# Patient Record
Sex: FEMALE | Race: WHITE
[De-identification: names, ages, dates, MRNs, and addresses within clinical notes are randomized per-mention and may not be internally consistent; named-entity substitution may affect disease eponyms.]

---

## 2018-09-05 ENCOUNTER — HOSPITAL ENCOUNTER (EMERGENCY)
Dept: HOSPITAL 97 - ER | Age: 83
Discharge: HOME | End: 2018-09-05
Payer: COMMERCIAL

## 2018-09-05 VITALS — OXYGEN SATURATION: 99 % | DIASTOLIC BLOOD PRESSURE: 78 MMHG | SYSTOLIC BLOOD PRESSURE: 121 MMHG

## 2018-09-05 VITALS — TEMPERATURE: 98.8 F

## 2018-09-05 DIAGNOSIS — Z91.048: ICD-10-CM

## 2018-09-05 DIAGNOSIS — I25.2: ICD-10-CM

## 2018-09-05 DIAGNOSIS — Z91.040: ICD-10-CM

## 2018-09-05 DIAGNOSIS — Z86.73: ICD-10-CM

## 2018-09-05 DIAGNOSIS — R04.0: Primary | ICD-10-CM

## 2018-09-05 DIAGNOSIS — Z79.82: ICD-10-CM

## 2018-09-05 DIAGNOSIS — Z79.01: ICD-10-CM

## 2018-09-05 PROCEDURE — 99283 EMERGENCY DEPT VISIT LOW MDM: CPT

## 2018-09-05 NOTE — ER
Nurse's Notes                                                                                     

 [facility]                                                                

Name: Farzaneh Ortiz                                                                               

Age: 85 yrs                                                                                       

Sex: Female                                                                                       

: 1932                                                                                   

MRN: [id number]                                                                                   

Arrival Date: 2018                                                                          

Time: 14:24                                                                                       

Account#: [id number]                                                                            

Bed 14                                                                                            

Private MD: Aly Quevedo E                                                                     

Diagnosis: Epistaxis                                                                              

                                                                                                  

Presentation:                                                                                     

                                                                                             

14:33 Presenting complaint: Significant other states: "her nose started bleeding today around aa5 

      11am". Pt reports she takes ASA and Plavix daily. Transition of care: patient was not       

      received from another setting of care. Onset of symptoms was 2018. Risk       

      Assessment: Do you want to hurt yourself or someone else? Patient reports no desire to      

      harm self or others. Initial Sepsis Screen: Does the patient meet any 2 criteria? No.       

      Patient's initial sepsis screen is negative. Does the patient have a suspected source       

      of infection? No. Patient's initial sepsis screen is negative. Care prior to arrival:       

      None.                                                                                       

14:33 Method Of Arrival: Wheelchair                                                           aa5 

14:33 Acuity: SHARIF 3                                                                           aa5 

                                                                                                  

Historical:                                                                                       

- Allergies:                                                                                      

14:35 Latex, Natural Rubber;                                                                  aa5 

- PMHx:                                                                                           

14:35 CHF; CVA; Myocardial infarction;                                                        aa5 

- PSHx:                                                                                           

14:35 Cholecystectomy;                                                                        aa5 

                                                                                                  

- Immunization history:: Adult Immunizations not up to date.                                      

- Social history:: Smoking status: Patient/guardian denies using tobacco.                         

- Ebola Screening: : No symptoms or risks identified at this time.                                

                                                                                                  

                                                                                                  

Screenin:45 Abuse screen: Denies threats or abuse. Denies injuries from another. Nutritional        aj  

      screening: No deficits noted. Tuberculosis screening: No symptoms or risk factors           

      identified. Fall Risk None identified.                                                      

                                                                                                  

Assessment:                                                                                       

14:45 General: Appears in no apparent distress. comfortable, Behavior is calm, cooperative,   aj  

      appropriate for age. Pain: Denies pain. Neuro: Level of Consciousness is awake, alert,      

      obeys commands, Oriented to person, place, time, situation, Appropriate for age.            

      Respiratory: Airway is patent Respiratory effort is even, unlabored, Respiratory            

      pattern is regular, symmetrical. EENT: Reports nasal discharge that is bloody. Derm:        

      Skin is intact, is healthy with good turgor, Skin is pink, warm \T\ dry. normal.            

14:46 Reassessment: Nose clamp placed on patient.                                             aj  

                                                                                                  

Vital Signs:                                                                                      

14:35  / 82; Pulse 76; Resp 18 S; Temp 98.8(TE); Pulse Ox 97% on R/A; Weight 56.7 kg    aa5 

      (R); Pain 0/10;                                                                             

16:07  / 78; Pulse 79; Resp 18; Pulse Ox 99% on R/A;                                    aj  

                                                                                                  

ED Course:                                                                                        

14:24 Patient arrived in ED.                                                                  rg4 

14:25 Aly Quevedo MD is Private Physician.                                                rg4 

14:34 Triage completed.                                                                       aa5 

14:35 Arm band placed on.                                                                     aa5 

14:44 Krystyna Macias, RN is Primary Nurse.                                                     aj  

14:45 Patient has correct armband on for positive identification.                             aj  

14:49 [facility]                                                        kb  

14:49 Eleuterio Dai MD is Attending Physician.                                             kb  

16:07 No provider procedures requiring assistance completed. Patient did not have IV access   aj  

      during this emergency room visit.                                                           

                                                                                                  

Administered Medications:                                                                         

14:56 Drug: Arpit-Synephrine Spray 0.5 % 2 sprays Route: Intranasal; Site: right nare;          aj  

16:08 Follow up: Response: Marked relief of symptoms                                          aj  

                                                                                                  

                                                                                                  

Outcome:                                                                                          

16:02 Discharge ordered by MD.                                                                kb  

16:07 Discharged to home via wheelchair, with family.                                         aj  

16:07 Condition: good                                                                             

16:07 Discharge instructions given to patient, family, Instructed on discharge instructions,      

      follow up and referral plans. Demonstrated understanding of instructions, follow-up         

      care.                                                                                       

16:08 Patient left the ED.                                                                    aj  

                                                                                                  

Signatures:                                                                                       

Florecita Chong FNP-C FNP-Krystyna Patricio RN RN aj Calderon, Audri, RN RN aa5 Garcia, Rubi                                 rg4                                                  

                                                                                                  

**************************************************************************************************

## 2018-09-05 NOTE — EDPHYS
Physician Documentation                                                                           

 [facility]                                                                

Name: Farzaneh Ortiz                                                                               

Age: 85 yrs                                                                                       

Sex: Female                                                                                       

: 1932                                                                                   

MRN: [id number]                                                                                   

Arrival Date: 2018                                                                          

Time: 14:24                                                                                       

Account#: [id number]                                                                            

Bed 14                                                                                            

Private MD: Aly Quevedo E                                                                     

ED Physician Eleuterio Dai                                                                      

HPI:                                                                                              

                                                                                             

15:10 This 85 yrs old  Female presents to ER via Wheelchair with complaints of Nose  kb  

      Bleed.                                                                                      

15:10 The patient presents with a nose bleed, occurred from an unknown cause, that is         kb  

      continuous small amount bright red, causative factors include: aspirin therapy, plavix      

      therapy. Onset: The symptoms/episode began/occurred at 11:00. Modifying factors: The        

      symptoms are alleviated by nothing. the symptoms are aggravated by nothing. Associated      

      signs and symptoms: Loss of consciousness: the patient experienced no loss of               

      consciousness, Pertinent positives: bleeding. Severity of symptoms: At their worst the      

      symptoms were moderate in the emergency department the symptoms are unchanged. The          

      patient has experienced similar episodes in the past, a few times. The patient has not      

      recently seen a physician. Spontaneous nose bleed (right nare) began at 1100 today..        

                                                                                                  

Historical:                                                                                       

- Allergies:                                                                                      

14:35 Latex, Natural Rubber;                                                                  aa5 

- PMHx:                                                                                           

14:35 CHF; CVA; Myocardial infarction;                                                        aa5 

- PSHx:                                                                                           

14:35 Cholecystectomy;                                                                        aa5 

                                                                                                  

- Immunization history:: Adult Immunizations not up to date.                                      

- Social history:: Smoking status: Patient/guardian denies using tobacco.                         

- Ebola Screening: : No symptoms or risks identified at this time.                                

                                                                                                  

                                                                                                  

ROS:                                                                                              

14:59 Constitutional: Negative for fever, chills, and weight loss, Cardiovascular: Negative   kb  

      for chest pain, palpitations, and edema, Respiratory: Negative for shortness of breath,     

      cough, wheezing, and pleuritic chest pain, Abdomen/GI: Negative for abdominal pain,         

      nausea, vomiting, diarrhea, and constipation, Back: Negative for injury and pain, :       

      Negative for injury, bleeding, discharge, and swelling, MS/Extremity: Negative for          

      injury and deformity, Skin: Negative for injury, rash, and discoloration, Neuro:            

      Negative for headache, weakness, numbness, tingling, and seizure.                           

14:59 ENT: Positive for nose bleed.                                                               

                                                                                                  

Exam:                                                                                             

14:59 Constitutional:  This is a well developed, well nourished patient who is awake, alert,  kb  

      and in no acute distress. Head/Face:  Normocephalic, atraumatic. Neck:  Trachea             

      midline, no thyromegaly or masses palpated, and no cervical lymphadenopathy.  Supple,       

      full range of motion without nuchal rigidity, or vertebral point tenderness.  No            

      Meningismus. Chest/axilla:  Normal chest wall appearance and motion.  Nontender with no     

      deformity.  No lesions are appreciated. Cardiovascular:  Regular rate and rhythm with a     

      normal S1 and S2.  No gallops, murmurs, or rubs.  Normal PMI, no JVD.  No pulse             

      deficits. Respiratory:  Lungs have equal breath sounds bilaterally, clear to                

      auscultation and percussion.  No rales, rhonchi or wheezes noted.  No increased work of     

      breathing, no retractions or nasal flaring. Abdomen/GI:  Soft, non-tender, with normal      

      bowel sounds.  No distension or tympany.  No guarding or rebound.  No evidence of           

      tenderness throughout. Back:  No spinal tenderness.  No costovertebral tenderness.          

      Full range of motion. Skin:  Warm, dry with normal turgor.  Normal color with no            

      rashes, no lesions, and no evidence of cellulitis. MS/ Extremity:  Pulses equal, no         

      cyanosis.  Neurovascular intact.  Full, normal range of motion. Neuro:  Awake and           

      alert, GCS 15, oriented to person, place, time, and situation.  Cranial nerves II-XII       

      grossly intact.  Motor strength 5/5 in all extremities.  Sensory grossly intact.            

      Cerebellar exam normal.  Normal gait.                                                       

14:59 ENT: Nose: bleeding, is seen from the right nare, and is minimal, clotted blood, in         

      right nare.                                                                                 

                                                                                                  

Vital Signs:                                                                                      

14:35  / 82; Pulse 76; Resp 18 S; Temp 98.8(TE); Pulse Ox 97% on R/A; Weight 56.7 kg    aa5 

      (R); Pain 0/10;                                                                             

16:07  / 78; Pulse 79; Resp 18; Pulse Ox 99% on R/A;                                    aj  

                                                                                                  

MDM:                                                                                              

14:49 Patient medically screened.                                                             kb  

15:10 Data reviewed: vital signs, nurses notes. Data interpreted: Pulse oximetry: on room air kb  

      is 97 %. Interpretation: normal. ED course: gauze soaked with neosynephrine placed in       

      right nostril.                                                                              

16:01 Counseling: I had a detailed discussion with the patient and/or guardian regarding: the kb  

      historical points, exam findings, and any diagnostic results supporting the                 

      discharge/admit diagnosis, the need for outpatient follow up, an ENT specialist, to         

      return to the emergency department if symptoms worsen or persist or if there are any        

      questions or concerns that arise at home. ED course: bleeding resolved. Pt states she       

      is ready to go home. Family and pt educated on nosebleed precautions and need for           

      follow up with ENT.                                                                         

                                                                                                  

Administered Medications:                                                                         

14:56 Drug: Arpit-Synephrine Spray 0.5 % 2 sprays Route: Intranasal; Site: right nare;          aj  

16:08 Follow up: Response: Marked relief of symptoms                                          aj  

                                                                                                  

                                                                                                  

Disposition:                                                                                      

                                                                                             

07:06 Co-signature as Attending Physician, Eleuterio Dai MD I agree with the assessment and  dorothy 

      plan of care.                                                                               

                                                                                                  

Disposition:                                                                                      

18 16:02 Discharged to Home. Impression: Epistaxis.                                         

- Condition is Stable.                                                                            

- Discharge Instructions: Nosebleed, Easy-to-Read.                                                

                                                                                                  

- Medication Reconciliation Form, Thank You Letter, Antibiotic Education, Prescription            

  Opioid Use form.                                                                                

- Follow up: Emergency Department; When: As needed; Reason: Worsening of condition.               

  Follow up: Private Physician; When: 2 - 3 days; Reason: Recheck today's complaints,             

  Continuance of care, Re-evaluation by your physician.                                           

                                                                                                  

                                                                                                  

                                                                                                  

Signatures:                                                                                       

Florecita Chong FNP-C FNP-Krystyna Patricio, RN                       Eleuterio Cui MD MD cha Calderon, Audri, RN                     RN   aa5                                                  

                                                                                                  

Corrections: (The following items were deleted from the chart)                                    

                                                                                             

16:08 16:02 2018 16:02 Discharged to Home. Impression: Epistaxis. Condition is Stable.  aj  

      Forms are Medication Reconciliation Form, Thank You Letter, Antibiotic Education,           

      Prescription Opioid Use. Follow up: Emergency Department; When: As needed; Reason:          

      Worsening of condition. Follow up: Private Physician; When: 2 - 3 days; Reason: Recheck     

      today's complaints, Continuance of care, Re-evaluation by your physician. kb                

                                                                                                  

**************************************************************************************************